# Patient Record
Sex: FEMALE | Race: WHITE | Employment: FULL TIME | URBAN - METROPOLITAN AREA
[De-identification: names, ages, dates, MRNs, and addresses within clinical notes are randomized per-mention and may not be internally consistent; named-entity substitution may affect disease eponyms.]

---

## 2023-03-31 ENCOUNTER — OFFICE VISIT (OUTPATIENT)
Dept: CARDIOLOGY CLINIC | Age: 48
End: 2023-03-31

## 2023-03-31 VITALS
HEIGHT: 64 IN | WEIGHT: 137.8 LBS | DIASTOLIC BLOOD PRESSURE: 74 MMHG | BODY MASS INDEX: 23.52 KG/M2 | HEART RATE: 69 BPM | SYSTOLIC BLOOD PRESSURE: 110 MMHG

## 2023-03-31 DIAGNOSIS — F32.A DEPRESSIVE DISORDER: ICD-10-CM

## 2023-03-31 DIAGNOSIS — R00.2 INTERMITTENT PALPITATIONS: ICD-10-CM

## 2023-03-31 DIAGNOSIS — Z87.891 FORMER SMOKER: ICD-10-CM

## 2023-03-31 DIAGNOSIS — Z82.41 FAMILY HISTORY OF SUDDEN CARDIAC DEATH: Primary | ICD-10-CM

## 2023-03-31 PROBLEM — D64.9 ANEMIA: Status: ACTIVE | Noted: 2023-03-20

## 2023-03-31 RX ORDER — BUPROPION HYDROCHLORIDE 75 MG/1
TABLET ORAL
COMMUNITY
Start: 2022-08-18

## 2023-03-31 RX ORDER — DESVENLAFAXINE SUCCINATE 50 MG/1
50 TABLET, EXTENDED RELEASE ORAL DAILY
COMMUNITY
Start: 2022-12-15

## 2023-03-31 RX ORDER — IBUPROFEN 800 MG/1
800 TABLET ORAL EVERY 8 HOURS PRN
COMMUNITY
Start: 2021-12-23

## 2023-03-31 RX ORDER — CHOLECALCIFEROL (VITAMIN D3) 125 MCG
5 CAPSULE ORAL PRN
COMMUNITY

## 2023-03-31 RX ORDER — TRAZODONE HYDROCHLORIDE 50 MG/1
50 TABLET ORAL
COMMUNITY
Start: 2022-08-18

## 2023-03-31 ASSESSMENT — ENCOUNTER SYMPTOMS
STRIDOR: 0
HEMATOCHEZIA: 0
WHEEZING: 0
EYE REDNESS: 0
DOUBLE VISION: 0
ABDOMINAL PAIN: 0
HEMOPTYSIS: 0
HOARSE VOICE: 0
HEMATEMESIS: 0

## 2023-03-31 NOTE — PROGRESS NOTES
No-significant QT prolongation, no signs of ischemia, no preexcitation or Brugada syndrome pattern. Intermittent palpitations  -     EKG 12 lead  -     Transthoracic echocardiogram (TTE) complete with contrast, bubble, strain, and 3D PRN; Future  -     Extended cardiac holter monitor (48 hrs - 15 days); Future  Getting 7-day Holter monitor to work this up. PVCs suspected. Depressive disorder  On meds for this. On Wellbutrin, Pristiq and trazodone as needed. Former smoker  Tong Velascoch occasionally now. Understands the need to quit this too. Overall Impression  She has episodes of palpitations but fortunately no syncope or presyncope. No TIAs or strokelike symptoms. Does not do any excessive caffeine use. Currently vapes but used to smoke in the past.  Counseled on the importance of trying to quit vaping too. 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 -No changes with meds for now. Baseline EKG shows normal QTc, no evidence of any Brugada syndrome or preexcitation. We will get her through an echo to rule out significant underlying structural heart disease. Fortunately no complaints of any angina or syncope lately. No heart failure-like symptoms including lower extremity edema orthopnea. We will also get her through a 7-day Holter monitor to rule out significant arrhythmia. Further plans per clinical course. Return in about 4 weeks (around 4/28/2023) for palpitations, scd. Thank you Camilo Mercado for allowing us to participate in the care of your patient. If you have any further questions, please do not hesitate to contact us.   Sincerely,        Domingo Singh MD   3/31/2023

## 2023-04-19 ENCOUNTER — TELEPHONE (OUTPATIENT)
Dept: CARDIOLOGY CLINIC | Age: 48
End: 2023-04-19

## 2023-04-19 NOTE — TELEPHONE ENCOUNTER
Notified pt of monitor results per Dr. Nickie ePpe. Pt verbalized understanding and verified appointments for next month.

## 2023-04-19 NOTE — TELEPHONE ENCOUNTER
----- Message from Bry Fonseca MD sent at 4/19/2023 10:14 AM EDT -----  Please inform the patient that we reviewed her Holter monitor results and it showed no dangerous arrhythmia-showed a normal rhythm-sinus rhythm throughout with an average heart rate of 76 bpm with a few rare PVCs and PACs-extra beats from the lower and upper chambers that she could feel and were associated with her patient triggers. These are not dangerous and can be monitored over time. We will go over these results with her in detail at her follow-up appointment next month. Keep follow-up appointment for her echo as scheduled next month.   Thanks,  AD

## 2023-05-16 ENCOUNTER — TELEPHONE (OUTPATIENT)
Age: 48
End: 2023-05-16

## 2023-05-16 NOTE — TELEPHONE ENCOUNTER
----- Message from Diana De La Garza MD sent at 5/15/2023  6:58 PM EDT -----  Please let her know that her echo results were reviewed and they came back normal.  Thanks,  AD